# Patient Record
Sex: FEMALE | Race: WHITE | NOT HISPANIC OR LATINO | Employment: STUDENT | URBAN - METROPOLITAN AREA
[De-identification: names, ages, dates, MRNs, and addresses within clinical notes are randomized per-mention and may not be internally consistent; named-entity substitution may affect disease eponyms.]

---
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
## 2024-02-02 NOTE — TELEPHONE ENCOUNTER
Called dad, no answer, LVM requesting return call.   
Called dad.  Scheduled for next available MD on 8/7 at 3pm. Informed dad I will notify Dr. Amezcua (pt last seen in 2012 for unrelated issue) and will call if needing to schedule sooner appt.   
Called dad. Confirmed appt for 12pm Wednesday in [place].   
I can Wednesday in [place] at 12 pm(ok to put there). May have to override. BM  
Incoming call from Melissa in [place].  Records are being sent for referral to peds GI for elevated LFTs and hx gallbladder removal.  Parents are hoping to be seen ASAP due to increasing numbers, but next available is 7/25.  The referring office also called in today. Please advise.   
Alert-The patient is alert, awake and responds to voice. The patient is oriented to time, place, and person. The triage nurse is able to obtain subjective information.

## 2024-08-05 ENCOUNTER — OFFICE VISIT (OUTPATIENT)
Dept: OTOLARYNGOLOGY | Facility: CLINIC | Age: 19
End: 2024-08-05
Payer: COMMERCIAL

## 2024-08-05 DIAGNOSIS — H71.90 CHOLESTEATOMA, UNSPECIFIED LATERALITY: ICD-10-CM

## 2024-08-05 DIAGNOSIS — H61.23 BILATERAL IMPACTED CERUMEN: ICD-10-CM

## 2024-08-05 PROCEDURE — 1159F MED LIST DOCD IN RCRD: CPT | Mod: CPTII,S$GLB,, | Performed by: OTOLARYNGOLOGY

## 2024-08-05 PROCEDURE — 99999 PR PBB SHADOW E&M-EST. PATIENT-LVL II: CPT | Mod: PBBFAC,,, | Performed by: OTOLARYNGOLOGY

## 2024-08-05 PROCEDURE — 69210 REMOVE IMPACTED EAR WAX UNI: CPT | Mod: S$GLB,,, | Performed by: OTOLARYNGOLOGY

## 2024-08-05 PROCEDURE — 99213 OFFICE O/P EST LOW 20 MIN: CPT | Mod: 25,S$GLB,, | Performed by: OTOLARYNGOLOGY

## 2025-01-07 ENCOUNTER — TELEPHONE (OUTPATIENT)
Dept: OTOLARYNGOLOGY | Facility: CLINIC | Age: 20
End: 2025-01-07

## 2025-01-13 ENCOUNTER — OFFICE VISIT (OUTPATIENT)
Dept: OTOLARYNGOLOGY | Facility: CLINIC | Age: 20
End: 2025-01-13
Payer: COMMERCIAL

## 2025-01-13 DIAGNOSIS — H60.8X1 CHRONIC ECZEMATOUS OTITIS EXTERNA OF RIGHT EAR: ICD-10-CM

## 2025-01-13 DIAGNOSIS — H61.23 BILATERAL IMPACTED CERUMEN: Primary | ICD-10-CM

## 2025-01-13 PROCEDURE — 1159F MED LIST DOCD IN RCRD: CPT | Mod: CPTII,S$GLB,, | Performed by: OTOLARYNGOLOGY

## 2025-01-13 PROCEDURE — 99213 OFFICE O/P EST LOW 20 MIN: CPT | Mod: 25,S$GLB,, | Performed by: OTOLARYNGOLOGY

## 2025-01-13 PROCEDURE — 99999 PR PBB SHADOW E&M-EST. PATIENT-LVL III: CPT | Mod: PBBFAC,,, | Performed by: OTOLARYNGOLOGY

## 2025-01-13 PROCEDURE — 69210 REMOVE IMPACTED EAR WAX UNI: CPT | Mod: S$GLB,,, | Performed by: OTOLARYNGOLOGY

## 2025-01-13 RX ORDER — FLUOCINOLONE ACETONIDE 0.11 MG/ML
3 OIL AURICULAR (OTIC) 2 TIMES DAILY
Qty: 20 ML | Refills: 2 | Status: SHIPPED | OUTPATIENT
Start: 2025-01-13

## 2025-01-13 NOTE — PROGRESS NOTES
Subjective     Patient ID: Rachael Spence is a 19 y.o. female.    Chief Complaint: Hx of Chol, Deo CI.    Had cart Tplasty AD, PORP with cart AS.    R Ear blocked.    Past Medical History: Patient has a past medical history of Allergy, Anxiety, Bruising, Cold intolerance, Decreased energy, Depression, Fatty liver disease, nonalcoholic, GERD (gastroesophageal reflux disease), Head ache, Headache(784.0), Hearing impairment, Normal speech and language development, Poor sleep pattern, POTS (postural orthostatic tachycardia syndrome), Problems with swallowing, and Sick frequently.    Past Surgical History: Patient has a past surgical history that includes Adenoidectomy; Tympanostomy tube placement; Esophagogastroduodenoscopy; Cholecystectomy; Liver biopsy (N/A, 8/3/2018); Mastoidectomy; Revision of mastoidectomy (Right, 7/22/2021); and Revision of tympanoplasty (Right, 7/22/2021).    Social History: Patient reports that she has never smoked. She has never used smokeless tobacco.    Family History: family history includes Cancer in her maternal grandfather; Diabetes in her paternal grandfather and paternal grandmother; Heart disease in her father, paternal grandfather, and paternal grandmother; Hyperlipidemia in her paternal grandfather and paternal grandmother; Hypertension in her paternal grandfather and paternal grandmother; Liver disease in her mother; Pancreatic cancer in her maternal grandfather; Sjogren's syndrome in her mother; Thyroid disease in her mother.    Medications:   Current Outpatient Medications   Medication Sig    FLUoxetine 20 MG capsule Take 20 mg by mouth once daily.    UNABLE TO FIND Referral to POTS specialist: Jose Antonio Power [phone]    UNABLE TO FIND EDS clinic [facility] - evaluate and treat this patient with EDS  Dr. Villalpando [phone], [phone] fax    UNABLE TO FIND Polysomnogram - eval and treat for AKILAH    calcium carbonate/vitamin D3 (VITAMIN D-3 ORAL) Take 3,000 Units by mouth  nightly.    cetirizine (ZYRTEC) 10 MG tablet Take 10 mg by mouth once daily.    co-enzyme Q-10 30 mg capsule Take 30 mg by mouth once daily.    fluocinolone acetonide oiL (DERMOTIC OIL) 0.01 % Drop Place 3 drops in ear(s) 2 (two) times daily.    HYDROcodone-acetaminophen (NORCO) 5-325 mg per tablet Take 1 tablet by mouth every 6 (six) hours as needed for Pain. (Patient not taking: Reported on 8/10/2022)    loratadine (CLARITIN) 10 mg tablet Take 10 mg by mouth.    omega-3 fatty acids/fish oil (FISH OIL-OMEGA-3 FATTY ACIDS) 300-1,000 mg capsule Take 1 capsule by mouth once daily.    ondansetron (ZOFRAN-ODT) 4 MG TbDL Take 1 tablet (4 mg total) by mouth every 6 (six) hours as needed (Nausea). (Patient not taking: Reported on 8/10/2022)    pediatric multivitamin chewable tablet Take 1 tablet by mouth once daily.      sertraline (ZOLOFT) 50 MG tablet Take 20 mg by mouth every evening.     TURMERIC ORAL Take by mouth nightly.     No current facility-administered medications for this visit.       Allergies: Patient has No Known Allergies.    HPI  Review of Systems   Constitutional:  Negative for appetite change, chills, fatigue and fever.   HENT:  Positive for hearing loss. Negative for nasal congestion, ear discharge, facial swelling, postnasal drip, rhinorrhea, sore throat, tinnitus and trouble swallowing.    Eyes:  Negative for photophobia, pain, discharge, redness, itching and visual disturbance.   Respiratory:  Negative for apnea, cough, choking, chest tightness, shortness of breath, wheezing and stridor.    Cardiovascular:  Negative for chest pain and palpitations.   Gastrointestinal:  Negative for abdominal pain, constipation, diarrhea, nausea and vomiting.   Musculoskeletal:  Negative for arthralgias, gait problem, joint swelling, myalgias, neck pain and neck stiffness.   Integumentary:  Negative for color change, pallor, rash and wound.   Neurological:  Negative for dizziness, tremors, seizures,  syncope, facial asymmetry, speech difficulty, weakness, light-headedness, numbness and headaches.   Hematological:  Negative for adenopathy. Does not bruise/bleed easily.   Psychiatric/Behavioral:  Negative for agitation, behavioral problems, confusion, decreased concentration, dysphoric mood, hallucinations, sleep disturbance and suicidal ideas. The patient is not nervous/anxious and is not hyperactive.           Objective     Physical Exam  Vitals and nursing note reviewed.   Constitutional:       General: She is not in acute distress.     Appearance: Normal appearance. She is well-developed. She is not ill-appearing, toxic-appearing or diaphoretic.   HENT:      Head: Normocephalic and atraumatic. Not macrocephalic and not microcephalic. No raccoon eyes, Malik's sign, abrasion, contusion, right periorbital erythema, left periorbital erythema or laceration. Hair is normal.      Right Ear: Ear canal normal. Decreased hearing noted. No laceration, drainage, swelling or tenderness. No middle ear effusion. There is impacted cerumen. No foreign body. No mastoid tenderness. No hemotympanum. Tympanic membrane is scarred. Tympanic membrane is not injected, perforated, erythematous, retracted or bulging. Tympanic membrane has decreased mobility.      Left Ear: Ear canal normal. No decreased hearing noted. No laceration, drainage, swelling or tenderness.  No middle ear effusion. There is impacted cerumen. No foreign body. No mastoid tenderness. No hemotympanum. Tympanic membrane is not injected, scarred, perforated, erythematous, retracted or bulging. Tympanic membrane has normal mobility.      Ears:        Comments:   Has Deo CI AD>>AS.    Procedure Note:    Patient was brought to the minor procedure room and using the operating microscope the right ear canal  was cleaned of ceruminous debris. There was a significant cerumen impaction.  The forceps and suction were both used to perform this. Tympanic membrane intact. Pt  tolerated well. There were no complications.    Procedure Note:    The patient was brought to the minor procedure room and placed under the operating microscope. Using a combination of suction, curettes and cup forceps the patient's cerumen impaction was removed. The tympanic membrane was evaluated and was unremarkable. The patient tolerated the procedure well. There were no complications.    Pos TERA AD    ALBERTO.         Nose: No nasal deformity, septal deviation, laceration, mucosal edema or rhinorrhea.      Right Sinus: No maxillary sinus tenderness.      Left Sinus: No maxillary sinus tenderness.   Eyes:      General: Lids are normal.      Conjunctiva/sclera: Conjunctivae normal.      Pupils: Pupils are equal, round, and reactive to light.   Neck:      Thyroid: No thyroid mass or thyromegaly.      Vascular: No JVD.      Trachea: No tracheal tenderness or tracheal deviation.   Cardiovascular:      Rate and Rhythm: Normal rate and regular rhythm.   Pulmonary:      Effort: Pulmonary effort is normal. No tachypnea, bradypnea, accessory muscle usage or respiratory distress.      Breath sounds: No stridor.   Abdominal:      Palpations: Abdomen is soft.   Musculoskeletal:         General: Normal range of motion.      Cervical back: Normal range of motion and neck supple. No edema, erythema or rigidity. No muscular tenderness. Normal range of motion.   Lymphadenopathy:      Head:      Right side of head: No submental, submandibular, tonsillar, preauricular or posterior auricular adenopathy.      Left side of head: No submental, submandibular, tonsillar, preauricular or posterior auricular adenopathy.      Cervical: No cervical adenopathy.      Right cervical: No superficial, deep or posterior cervical adenopathy.     Left cervical: No superficial, deep or posterior cervical adenopathy.   Skin:     General: Skin is warm and dry.      Coloration: Skin is not pale.      Findings: No abrasion, bruising, burn, ecchymosis,  erythema, laceration, lesion or rash.   Neurological:      Mental Status: She is alert and oriented to person, place, and time.      Cranial Nerves: No cranial nerve deficit.      Sensory: No sensory deficit.      Motor: No tremor, atrophy, abnormal muscle tone or seizure activity.   Psychiatric:         Speech: She is communicative. Speech is not rapid and pressured or slurred.         Behavior: Behavior normal. Behavior is cooperative.         Thought Content: Thought content normal.         Judgment: Judgment normal.          Assessment and Plan     1. Bilateral impacted cerumen    2. Chronic eczematous otitis externa of right ear    Other orders  -     fluocinolone acetonide oiL (DERMOTIC OIL) 0.01 % Drop; Place 3 drops in ear(s) 2 (two) times daily.  Dispense: 20 mL; Refill: 2        RTC 1 yr         No follow-ups on file.

## 2025-07-17 NOTE — TELEPHONE ENCOUNTER
Called mom back as requested. No answer; lvm for return call.    Thank you so much for choosing me to provide your care today!    If you were dilated your vision may remain blurry   or light sensitive for several hours.    The nature of eye and vision problems can require frequent follow up, please make every effort to adhere to any future appointments.    If you have any issues, questions, or concerns,   please do not hesitate to reach out.    If you receive a survey in regards to your care today, please mention any exceptional care my office staff and/or technicians provided.    You can reach our office at this number:    [phone]    Please consider signing up for and utilizing [employer]!  This is the best way to directly reach me or other  providers

## (undated) DEVICE — BLADE OTOLOGY BEAVER 5X84MM

## (undated) DEVICE — SUCTION SURGICAL FRAZR

## (undated) DEVICE — KIT SUCTION IRRIGATION

## (undated) DEVICE — BURR CUTT CARB 3X38 E9000 FLUT

## (undated) DEVICE — ELECTRODE NDL

## (undated) DEVICE — SEE MEDLINE ITEM 152622

## (undated) DEVICE — SUT BONE WAX 2.5 GRMS 12/BX

## (undated) DEVICE — BURR LSO ROUND FLUTED 5MM

## (undated) DEVICE — INSTRUMENT SURG SUCT FRZR W/C

## (undated) DEVICE — SOL IRR WATER STRL 3000 ML

## (undated) DEVICE — BLADE SCALP OPTHL NDL TIP 3MM

## (undated) DEVICE — ELECTRODE REM PLYHSV RETURN 9

## (undated) DEVICE — BLADE SURG CARBON STEEL SZ11

## (undated) DEVICE — SEE MEDLINE ITEM 146373

## (undated) DEVICE — BIT DRILL TUBING

## (undated) DEVICE — BLADE SCALP OPHTL RND TIP

## (undated) DEVICE — SUT 3/0 18IN COATED VICRYLP

## (undated) DEVICE — Device

## (undated) DEVICE — SEE MEDLINE ITEM 157128

## (undated) DEVICE — SOL IRR NACL .9% 3000ML

## (undated) DEVICE — KIT DRESS GLASSCK EAR ADLT ST

## (undated) DEVICE — BURR STEEL ROUND E9000 2X48MM

## (undated) DEVICE — CLIPPER BLADE MOD 4406 (CAREF)

## (undated) DEVICE — KIT EAR EAOFE